# Patient Record
Sex: FEMALE | Race: WHITE | NOT HISPANIC OR LATINO | URBAN - METROPOLITAN AREA
[De-identification: names, ages, dates, MRNs, and addresses within clinical notes are randomized per-mention and may not be internally consistent; named-entity substitution may affect disease eponyms.]

---

## 2019-11-22 ENCOUNTER — OUTPATIENT (OUTPATIENT)
Dept: OUTPATIENT SERVICES | Facility: HOSPITAL | Age: 79
LOS: 1 days | End: 2019-11-22
Payer: MEDICARE

## 2019-11-22 PROCEDURE — 71046 X-RAY EXAM CHEST 2 VIEWS: CPT | Mod: 26

## 2019-11-23 ENCOUNTER — APPOINTMENT (OUTPATIENT)
Dept: CT IMAGING | Facility: CLINIC | Age: 79
End: 2019-11-23
Payer: MEDICARE

## 2019-11-23 PROCEDURE — 71250 CT THORAX DX C-: CPT

## 2020-11-10 ENCOUNTER — INPATIENT (INPATIENT)
Facility: HOSPITAL | Age: 80
LOS: 16 days | Discharge: HOME | End: 2020-11-27
Attending: COLON & RECTAL SURGERY
Payer: MEDICARE

## 2020-11-10 PROCEDURE — 93010 ELECTROCARDIOGRAM REPORT: CPT

## 2020-11-10 PROCEDURE — 99285 EMERGENCY DEPT VISIT HI MDM: CPT | Mod: CS

## 2020-11-10 PROCEDURE — 74018 RADEX ABDOMEN 1 VIEW: CPT | Mod: 26

## 2020-11-10 PROCEDURE — 71045 X-RAY EXAM CHEST 1 VIEW: CPT | Mod: 26

## 2020-11-10 PROCEDURE — 74177 CT ABD & PELVIS W/CONTRAST: CPT | Mod: 26

## 2020-11-11 ENCOUNTER — OUTPATIENT (OUTPATIENT)
Dept: OUTPATIENT SERVICES | Facility: HOSPITAL | Age: 80
LOS: 1 days | End: 2020-11-11

## 2020-11-11 PROCEDURE — 74176 CT ABD & PELVIS W/O CONTRAST: CPT | Mod: 26

## 2020-11-11 PROCEDURE — 74018 RADEX ABDOMEN 1 VIEW: CPT | Mod: 26

## 2020-11-12 PROCEDURE — 74018 RADEX ABDOMEN 1 VIEW: CPT | Mod: 26

## 2020-11-13 PROCEDURE — 74018 RADEX ABDOMEN 1 VIEW: CPT | Mod: 26

## 2020-11-15 PROCEDURE — 74176 CT ABD & PELVIS W/O CONTRAST: CPT | Mod: 26

## 2020-11-20 PROCEDURE — 74018 RADEX ABDOMEN 1 VIEW: CPT | Mod: 26,76

## 2020-11-21 PROCEDURE — 74018 RADEX ABDOMEN 1 VIEW: CPT | Mod: 26

## 2020-11-21 PROCEDURE — 93010 ELECTROCARDIOGRAM REPORT: CPT

## 2020-11-22 PROCEDURE — 76705 ECHO EXAM OF ABDOMEN: CPT | Mod: 26

## 2020-11-24 PROCEDURE — 74018 RADEX ABDOMEN 1 VIEW: CPT | Mod: 26

## 2021-09-09 ENCOUNTER — OUTPATIENT (OUTPATIENT)
Dept: OUTPATIENT SERVICES | Facility: HOSPITAL | Age: 81
LOS: 1 days | End: 2021-09-09
Payer: MEDICARE

## 2021-09-09 PROCEDURE — 74270 X-RAY XM COLON 1CNTRST STD: CPT | Mod: 26

## 2021-10-05 ENCOUNTER — OUTPATIENT (OUTPATIENT)
Dept: OUTPATIENT SERVICES | Facility: HOSPITAL | Age: 81
LOS: 1 days | End: 2021-10-05
Payer: MEDICARE

## 2021-10-05 PROCEDURE — 93010 ELECTROCARDIOGRAM REPORT: CPT

## 2021-10-10 ENCOUNTER — APPOINTMENT (OUTPATIENT)
Dept: DISASTER EMERGENCY | Facility: CLINIC | Age: 81
End: 2021-10-10

## 2021-10-10 DIAGNOSIS — Z01.818 ENCOUNTER FOR OTHER PREPROCEDURAL EXAMINATION: ICD-10-CM

## 2021-10-11 LAB — SARS-COV-2 N GENE NPH QL NAA+PROBE: NOT DETECTED

## 2021-10-13 ENCOUNTER — INPATIENT (INPATIENT)
Facility: HOSPITAL | Age: 81
LOS: 20 days | Discharge: HOME HEALTH | End: 2021-11-03
Attending: COLON & RECTAL SURGERY | Admitting: COLON & RECTAL SURGERY
Payer: MEDICARE

## 2021-10-13 PROCEDURE — 71045 X-RAY EXAM CHEST 1 VIEW: CPT | Mod: 26

## 2021-10-15 PROCEDURE — 74177 CT ABD & PELVIS W/CONTRAST: CPT | Mod: 26

## 2021-10-15 PROCEDURE — 71046 X-RAY EXAM CHEST 2 VIEWS: CPT | Mod: 26

## 2021-10-19 PROCEDURE — 74019 RADEX ABDOMEN 2 VIEWS: CPT | Mod: 26

## 2021-10-19 PROCEDURE — 72170 X-RAY EXAM OF PELVIS: CPT | Mod: 26

## 2021-10-21 PROCEDURE — 71045 X-RAY EXAM CHEST 1 VIEW: CPT | Mod: 26

## 2021-10-21 PROCEDURE — 74018 RADEX ABDOMEN 1 VIEW: CPT | Mod: 26

## 2021-10-21 PROCEDURE — 93010 ELECTROCARDIOGRAM REPORT: CPT

## 2021-10-22 PROCEDURE — 74176 CT ABD & PELVIS W/O CONTRAST: CPT | Mod: 26

## 2021-10-22 PROCEDURE — 93970 EXTREMITY STUDY: CPT | Mod: 26

## 2021-10-25 PROCEDURE — 93971 EXTREMITY STUDY: CPT | Mod: 26,RT

## 2021-10-25 PROCEDURE — 71045 X-RAY EXAM CHEST 1 VIEW: CPT | Mod: 26,76

## 2021-10-25 PROCEDURE — 74176 CT ABD & PELVIS W/O CONTRAST: CPT | Mod: 26

## 2021-10-26 PROCEDURE — 75989 ABSCESS DRAINAGE UNDER X-RAY: CPT | Mod: 26

## 2021-10-27 PROCEDURE — 71045 X-RAY EXAM CHEST 1 VIEW: CPT | Mod: 26

## 2021-11-01 PROCEDURE — 71045 X-RAY EXAM CHEST 1 VIEW: CPT | Mod: 26

## 2022-02-23 ENCOUNTER — PREPPED CHART (OUTPATIENT)
Dept: URBAN - METROPOLITAN AREA CLINIC 43 | Facility: CLINIC | Age: 82
End: 2022-02-23

## 2022-04-05 ENCOUNTER — CONSULTATION/EVALUATION (OUTPATIENT)
Dept: URBAN - METROPOLITAN AREA CLINIC 39 | Facility: CLINIC | Age: 82
End: 2022-04-05

## 2022-04-05 DIAGNOSIS — H43.813: ICD-10-CM

## 2022-04-05 DIAGNOSIS — H01.02A: ICD-10-CM

## 2022-04-05 DIAGNOSIS — G50.0: ICD-10-CM

## 2022-04-05 DIAGNOSIS — H01.02B: ICD-10-CM

## 2022-04-05 DIAGNOSIS — H16.211: ICD-10-CM

## 2022-04-05 DIAGNOSIS — H25.813: ICD-10-CM

## 2022-04-05 DIAGNOSIS — H18.513: ICD-10-CM

## 2022-04-05 PROCEDURE — 92136TC INTERFEROMETRY - TECHNICAL COMPONENT

## 2022-04-05 PROCEDURE — 92286 ANT SGM IMG I&R SPECLR MIC: CPT

## 2022-04-05 PROCEDURE — V2799PMN IMPRIMIS PRED-MOXI-NEPAF 5ML

## 2022-04-05 PROCEDURE — 92134 CPTRZ OPH DX IMG PST SGM RTA: CPT

## 2022-04-05 PROCEDURE — 92004 COMPRE OPH EXAM NEW PT 1/>: CPT

## 2022-04-05 PROCEDURE — 92025AV CORNEAL TOPOGRAPHY, AV

## 2022-04-05 RX ORDER — NEOMYCIN SULFATE, POLYMYXIN B SULFATE AND DEXAMETHASONE 3.5; 10000; 1 MG/G; [USP'U]/G; MG/G
OINTMENT OPHTHALMIC
Start: 2022-04-05

## 2022-04-05 ASSESSMENT — VISUAL ACUITY
OS_CC: 20/60-2
OD_SC: 20/60-1
OD_BAT: 20/100
OS_CC: J2
OD_CC: 20/60-1
OS_AM: 20/20
OD_SC: J8
OD_CC: J8
OS_BAT: 20/100
OD_RAM: 20/20
OS_SC: J10
OS_SC: 20/70-1

## 2022-04-05 ASSESSMENT — TONOMETRY
OS_IOP_MMHG: 16
OD_IOP_MMHG: 12

## 2022-04-05 NOTE — PATIENT DISCUSSION
Patient is a candidate for all options as long as Dry eye/Blepharitis Tx is followed and cornea improves. (ZAYNAB/Bleph Tx sheet given today) Will decide goal for surgery in 2 weeks at follow up with [facility]. See #3.

## 2022-04-05 NOTE — PATIENT DISCUSSION
Will treat with Maxitrol ointment QID OU. PFATs QID OU. Start Lid scrubs BID OU. Bring patient back in 2 weeks to follow up with improvement. Will decide goal for surgery at that time.

## 2022-04-26 ENCOUNTER — FOLLOW UP (OUTPATIENT)
Dept: URBAN - METROPOLITAN AREA CLINIC 39 | Facility: CLINIC | Age: 82
End: 2022-04-26

## 2022-04-26 DIAGNOSIS — H25.813: ICD-10-CM

## 2022-04-26 PROCEDURE — 99214 OFFICE O/P EST MOD 30 MIN: CPT

## 2022-04-26 ASSESSMENT — TONOMETRY
OD_IOP_MMHG: 15
OS_IOP_MMHG: 15

## 2022-04-26 ASSESSMENT — VISUAL ACUITY
OD_SC: 20/100
OS_SC: 20/70-2

## 2022-05-17 ENCOUNTER — POST-OP (OUTPATIENT)
Dept: URBAN - METROPOLITAN AREA CLINIC 39 | Facility: CLINIC | Age: 82
End: 2022-05-17

## 2022-05-17 ENCOUNTER — SURGERY/PROCEDURE (OUTPATIENT)
Dept: URBAN - METROPOLITAN AREA CLINIC 39 | Facility: CLINIC | Age: 82
End: 2022-05-17

## 2022-05-17 ENCOUNTER — PRE-OP/H&P (OUTPATIENT)
Dept: URBAN - METROPOLITAN AREA CLINIC 39 | Facility: CLINIC | Age: 82
End: 2022-05-17

## 2022-05-17 DIAGNOSIS — H25.813: ICD-10-CM

## 2022-05-17 DIAGNOSIS — Z96.1: ICD-10-CM

## 2022-05-17 DIAGNOSIS — H01.02A: ICD-10-CM

## 2022-05-17 DIAGNOSIS — H01.02B: ICD-10-CM

## 2022-05-17 PROCEDURE — 6698454AV REMOVE CATARACT, INSERT ADVANCED LENS (SX ONLY)

## 2022-05-17 PROCEDURE — 66999LNSR LENSAR LASER FOR CAT SX

## 2022-05-17 PROCEDURE — 99211T TECH SERVICE

## 2022-05-17 ASSESSMENT — TONOMETRY
OD_IOP_MMHG: 14
OS_IOP_MMHG: 16

## 2022-05-17 ASSESSMENT — VISUAL ACUITY: OD_SC: 20/70

## 2022-05-17 NOTE — PATIENT DISCUSSION
Significant objective improvement from treatment. Subjective irritation OD that is temporarily relieved with PFATs. Patient to see Dr. Telma Cuevas for punctal plug OD before cataract surgery. Continue PFATs QID OU, Lid scrubs BID OU. Can discontinue Maxitrol at this time. Patient is cleared for Advanced goal: emmetropia OU.

## 2022-05-17 NOTE — PATIENT DISCUSSION
Patient advised of the right to post-operative care by the surgeon. Patient is fully informed of, and agreed to, co-management with their primary optometric physician. Post-operative care by the surgeon is not medically necessary and co-management is clinically appropriate. Patient has received itemization of fees related to cataract surgery. Transfer of care letter completed for the patient. Transfer care of right eye to Dr. Khalida Beverly on 5/17/22. Patient instructed to call immediately if any new distortion, blurring, decreased vision or eye pain.

## 2022-05-17 NOTE — PATIENT DISCUSSION
Significant objective improvement from treatment. Subjective irritation OD that is temporarily relieved with PFATs. Patient to see Dr. Betty Manzanares for punctal plug OD before cataract surgery. Continue PFATs QID OU, Lid scrubs BID OU. Can discontinue Maxitrol at this time. Patient is cleared for Advanced goal: emmetropia OU.

## 2022-05-17 NOTE — PATIENT DISCUSSION
Significant objective improvement from treatment. Subjective irritation OD that is temporarily relieved with PFATs. Patient to see Dr. Ioana Diaz for punctal plug OD before cataract surgery. Continue PFATs QID OU, Lid scrubs BID OU. Can discontinue Maxitrol at this time. Patient is cleared for Advanced goal: emmetropia OU.

## 2022-05-26 ENCOUNTER — POST OP/EVAL OF SECOND EYE (OUTPATIENT)
Dept: URBAN - METROPOLITAN AREA CLINIC 39 | Facility: CLINIC | Age: 82
End: 2022-05-26

## 2022-05-26 DIAGNOSIS — Z96.1: ICD-10-CM

## 2022-05-26 DIAGNOSIS — H25.812: ICD-10-CM

## 2022-05-26 DIAGNOSIS — H25.89: ICD-10-CM

## 2022-05-26 PROCEDURE — P6698455 NON-COMANAGED ADVANCED PO

## 2022-05-26 PROCEDURE — 99213 OFFICE O/P EST LOW 20 MIN: CPT

## 2022-05-26 ASSESSMENT — VISUAL ACUITY
OS_BAT: 20/100
OD_SC: J4
OS_SC: J12
OD_PH: 20/40+2
OS_PH: 20/60
OS_SC: 20/70
OD_SC: 20/40
OS_AM: 20/20

## 2022-05-26 ASSESSMENT — TONOMETRY
OD_IOP_MMHG: 14
OS_IOP_MMHG: 14

## 2022-05-26 NOTE — PATIENT DISCUSSION
Significant objective improvement from treatment. Subjective irritation OD that is temporarily relieved with PFATs. Patient to see Dr. Beau Mchugh for punctal plug OD before cataract surgery. Continue PFATs QID OU, Lid scrubs BID OU. Can discontinue Maxitrol at this time. Patient is cleared for Advanced goal: emmetropia OU.

## 2022-05-31 ENCOUNTER — SURGERY/PROCEDURE (OUTPATIENT)
Dept: URBAN - METROPOLITAN AREA CLINIC 39 | Facility: CLINIC | Age: 82
End: 2022-05-31

## 2022-05-31 ENCOUNTER — PRE-OP/H&P (OUTPATIENT)
Dept: URBAN - METROPOLITAN AREA SURGERY 14 | Facility: SURGERY | Age: 82
End: 2022-05-31

## 2022-05-31 DIAGNOSIS — H25.812: ICD-10-CM

## 2022-05-31 DIAGNOSIS — Z96.1: ICD-10-CM

## 2022-05-31 PROCEDURE — 6698454AV REMOVE CATARACT, INSERT ADVANCED LENS (SX ONLY)

## 2022-05-31 PROCEDURE — 99211T TECH SERVICE

## 2022-05-31 PROCEDURE — 66999LNSR LENSAR LASER FOR CAT SX

## 2022-05-31 NOTE — PATIENT DISCUSSION
Significant objective improvement from treatment. Subjective irritation OD that is temporarily relieved with PFATs. Patient to see Dr. Da López for punctal plug OD before cataract surgery. Continue PFATs QID OU, Lid scrubs BID OU. Can discontinue Maxitrol at this time. Patient is cleared for Advanced goal: emmetropia OU.

## 2022-05-31 NOTE — PATIENT DISCUSSION
Significant objective improvement from treatment. Subjective irritation OD that is temporarily relieved with PFATs. Patient to see Dr. Jalzyn Wynn for punctal plug OD before cataract surgery. Continue PFATs QID OU, Lid scrubs BID OU. Can discontinue Maxitrol at this time. Patient is cleared for Advanced goal: emmetropia OU.

## 2022-06-02 ENCOUNTER — POST-OP (OUTPATIENT)
Dept: URBAN - METROPOLITAN AREA CLINIC 43 | Facility: CLINIC | Age: 82
End: 2022-06-02

## 2022-06-02 DIAGNOSIS — Z96.1: ICD-10-CM

## 2022-06-02 PROCEDURE — P6698455 NON-COMANAGED ADVANCED PO

## 2022-06-02 ASSESSMENT — VISUAL ACUITY
OD_SC: 20/40-1
OD_SC: J1
OS_SC: J2
OS_SC: 20/40-2

## 2022-06-02 ASSESSMENT — TONOMETRY
OS_IOP_MMHG: 12
OD_IOP_MMHG: 14

## 2022-06-02 NOTE — PATIENT DISCUSSION
Significant objective improvement from treatment. Subjective irritation OD that is temporarily relieved with PFATs. Patient to see Dr. Hermosillo Mail for punctal plug OD before cataract surgery. Continue PFATs QID OU, Lid scrubs BID OU. Can discontinue Maxitrol at this time. Patient is cleared for Advanced goal: emmetropia OU.

## 2024-02-26 ENCOUNTER — SURGERY/PROCEDURE (OUTPATIENT)
Facility: LOCATION | Age: 84
End: 2024-02-26

## 2024-02-26 ENCOUNTER — CONSULTATION/EVALUATION (OUTPATIENT)
Dept: URBAN - METROPOLITAN AREA CLINIC 39 | Facility: CLINIC | Age: 84
End: 2024-02-26

## 2024-02-26 DIAGNOSIS — H04.123: ICD-10-CM

## 2024-02-26 DIAGNOSIS — H26.493: ICD-10-CM

## 2024-02-26 DIAGNOSIS — H35.3131: ICD-10-CM

## 2024-02-26 PROCEDURE — 92014 COMPRE OPH EXAM EST PT 1/>: CPT

## 2024-02-26 PROCEDURE — 92134 CPTRZ OPH DX IMG PST SGM RTA: CPT

## 2024-02-26 ASSESSMENT — VISUAL ACUITY
OS_BAT: 20/50
OD_SC: 20/25
OS_SC: 20/25
OD_SC: J1-2
OD_BAT: 20/60
OS_SC: J2+2

## 2024-02-26 ASSESSMENT — TONOMETRY
OS_IOP_MMHG: 12
OD_IOP_MMHG: 10

## 2024-10-07 ENCOUNTER — APPOINTMENT (OUTPATIENT)
Dept: OPHTHALMOLOGY | Facility: CLINIC | Age: 84
End: 2024-10-07
Payer: MEDICARE

## 2024-10-07 ENCOUNTER — NON-APPOINTMENT (OUTPATIENT)
Age: 84
End: 2024-10-07

## 2024-10-07 PROCEDURE — 92002 INTRM OPH EXAM NEW PATIENT: CPT

## 2025-04-16 ENCOUNTER — CONSULTATION/EVALUATION (OUTPATIENT)
Age: 85
End: 2025-04-16

## 2025-04-16 DIAGNOSIS — H04.123: ICD-10-CM

## 2025-04-16 DIAGNOSIS — H35.3131: ICD-10-CM

## 2025-04-16 DIAGNOSIS — H18.513: ICD-10-CM

## 2025-04-16 PROCEDURE — 92134 CPTRZ OPH DX IMG PST SGM RTA: CPT

## 2025-04-16 PROCEDURE — 92025 CPTRIZED CORNEAL TOPOGRAPHY: CPT

## 2025-04-16 PROCEDURE — 99214 OFFICE O/P EST MOD 30 MIN: CPT

## 2025-04-16 RX ORDER — LOTILANER OPHTHALMIC SOLUTION 2.5 MG/ML: 1 SOLUTION/ DROPS OPHTHALMIC TWICE A DAY

## 2025-04-25 ENCOUNTER — CLINIC PROCEDURE ONLY (OUTPATIENT)
Age: 85
End: 2025-04-25

## 2025-04-25 DIAGNOSIS — H04.123: ICD-10-CM

## 2025-04-25 PROCEDURE — 99199ST SERUM TEARS
